# Patient Record
(demographics unavailable — no encounter records)

---

## 2021-01-10 NOTE — NUR
PLEASE NOTE UPON ADMISSION ASSESSMENT PATIENT WOULD NOT REMOVE HER CLOTHING
FOR A SKIN ASSESSMENT OR TO GET INTO A YELLOW FALL RISK GOWN. SHE IS COVERED
HEAD TO TOE WITH A LONG SKIRT, LONG SOCKS, AND LONG SLEEVES. SHE DID ENDORSE
THAT HER SKIN WAS INTACT WITH THE EXCEPTION OF "BAD BRUISES" ON HER HANDS.
UPON ASSESSMENT THERE WERE NO BRUISES ON HER HANDS. PT WAS PLACED ON FALL RISK
PRECAUTIONS. SHE WAS NOTED TO TURN OFF THE BED ALARM WITHIN MINUTES OF ME
TURNING IT ON. STRIP ALARM ALSO PLACED. WILL MONITOR FREQUENTLY. PT DOES
AMBULATE INDEPENDENTLY WITHOUT INCIDENT BUT ENDORSES A FALL 1 MONTH
AGO.

## 2021-10-18 NOTE — NUR
jeanne from pharmace aware pt meds from [facility] in [facility].